# Patient Record
Sex: FEMALE | Race: WHITE | NOT HISPANIC OR LATINO | Employment: FULL TIME | URBAN - METROPOLITAN AREA
[De-identification: names, ages, dates, MRNs, and addresses within clinical notes are randomized per-mention and may not be internally consistent; named-entity substitution may affect disease eponyms.]

---

## 2021-07-20 ENCOUNTER — HOSPITAL ENCOUNTER (EMERGENCY)
Facility: HOSPITAL | Age: 66
Discharge: HOME | End: 2021-07-20
Attending: EMERGENCY MEDICINE
Payer: COMMERCIAL

## 2021-07-20 ENCOUNTER — APPOINTMENT (EMERGENCY)
Dept: RADIOLOGY | Facility: HOSPITAL | Age: 66
End: 2021-07-20
Payer: COMMERCIAL

## 2021-07-20 VITALS
TEMPERATURE: 97.9 F | RESPIRATION RATE: 15 BRPM | SYSTOLIC BLOOD PRESSURE: 169 MMHG | OXYGEN SATURATION: 100 % | WEIGHT: 140 LBS | BODY MASS INDEX: 21.97 KG/M2 | DIASTOLIC BLOOD PRESSURE: 79 MMHG | HEIGHT: 67 IN | HEART RATE: 57 BPM

## 2021-07-20 DIAGNOSIS — S62.652B OPEN NONDISPLACED FRACTURE OF MIDDLE PHALANX OF RIGHT MIDDLE FINGER, INITIAL ENCOUNTER: Primary | ICD-10-CM

## 2021-07-20 DIAGNOSIS — S61.212A LACERATION OF RIGHT MIDDLE FINGER WITHOUT FOREIGN BODY WITHOUT DAMAGE TO NAIL, INITIAL ENCOUNTER: ICD-10-CM

## 2021-07-20 PROCEDURE — 99283 EMERGENCY DEPT VISIT LOW MDM: CPT

## 2021-07-20 PROCEDURE — 12002 RPR S/N/AX/GEN/TRNK2.6-7.5CM: CPT | Performed by: EMERGENCY MEDICINE

## 2021-07-20 PROCEDURE — 73140 X-RAY EXAM OF FINGER(S): CPT

## 2021-07-20 PROCEDURE — 96365 THER/PROPH/DIAG IV INF INIT: CPT

## 2021-07-20 PROCEDURE — 99284 EMERGENCY DEPT VISIT MOD MDM: CPT | Performed by: EMERGENCY MEDICINE

## 2021-07-20 RX ORDER — OXYCODONE HYDROCHLORIDE AND ACETAMINOPHEN 5; 325 MG/1; MG/1
1 TABLET ORAL EVERY 4 HOURS PRN
Qty: 20 TABLET | Refills: 0 | Status: SHIPPED | OUTPATIENT
Start: 2021-07-20

## 2021-07-20 RX ORDER — CEPHALEXIN 500 MG/1
500 CAPSULE ORAL EVERY 6 HOURS SCHEDULED
Qty: 28 CAPSULE | Refills: 0 | Status: SHIPPED | OUTPATIENT
Start: 2021-07-20 | End: 2021-07-27

## 2021-07-20 RX ORDER — GINSENG 100 MG
1 CAPSULE ORAL ONCE
Status: COMPLETED | OUTPATIENT
Start: 2021-07-20 | End: 2021-07-20

## 2021-07-20 RX ORDER — CEFAZOLIN SODIUM 2 G/50ML
2000 SOLUTION INTRAVENOUS ONCE
Status: COMPLETED | OUTPATIENT
Start: 2021-07-20 | End: 2021-07-20

## 2021-07-20 RX ORDER — LIDOCAINE HYDROCHLORIDE 10 MG/ML
10 INJECTION, SOLUTION EPIDURAL; INFILTRATION; INTRACAUDAL; PERINEURAL ONCE
Status: COMPLETED | OUTPATIENT
Start: 2021-07-20 | End: 2021-07-20

## 2021-07-20 RX ADMIN — LIDOCAINE HYDROCHLORIDE 10 ML: 10 INJECTION, SOLUTION EPIDURAL; INFILTRATION; INTRACAUDAL at 13:57

## 2021-07-20 RX ADMIN — BACITRACIN 1 SMALL APPLICATION: 500 OINTMENT TOPICAL at 13:57

## 2021-07-20 RX ADMIN — CEFAZOLIN SODIUM 2000 MG: 2 SOLUTION INTRAVENOUS at 13:56

## 2021-07-20 NOTE — ED PROVIDER NOTES
History  Chief Complaint   Patient presents with    Finger Injury     middle finger on right hand slammed in garage door, sent here by urgent care     Patient is a 59-year-old female  Tetanus vaccination is up-to-date  Around 9:00 a m  She got her right middle finger caught in a manual garage door  She sustained a laceration to the right middle finger on the ventral surface  No associated motor or sensory complaints  No other injuries  She did have a lot of bleeding but that did stop  She is left handed  She presented initially to urgent care but was referred to the emergency room  None       History reviewed  No pertinent past medical history  History reviewed  No pertinent surgical history  History reviewed  No pertinent family history  I have reviewed and agree with the history as documented  E-Cigarette/Vaping     E-Cigarette/Vaping Substances     Social History     Tobacco Use    Smoking status: Never Smoker    Smokeless tobacco: Never Used   Substance Use Topics    Alcohol use: Not Currently    Drug use: Never       Review of Systems   Skin: Positive for wound  Negative for pallor  Neurological: Negative for weakness and numbness  All other systems reviewed and are negative  Physical Exam  Physical Exam  Vitals reviewed  Constitutional:       General: She is not in acute distress  HENT:      Head: Normocephalic and atraumatic  Nose: Nose normal       Mouth/Throat:      Mouth: Mucous membranes are moist    Eyes:      General:         Right eye: No discharge  Left eye: No discharge  Conjunctiva/sclera: Conjunctivae normal    Pulmonary:      Effort: Pulmonary effort is normal  No respiratory distress  Musculoskeletal:         General: Swelling, tenderness and signs of injury present  No deformity  Cervical back: Normal range of motion and neck supple  Comments:  On the right middle finger, ventral surface, there is a 3 cm laceration near the PIP joint oriented axially  Pain there is no active bleeding  Neurovascular exam is normal   There is normal function of the superficial and deep flexors  No foreign body  There is bruising and swelling to the finger distally  Skin:     General: Skin is warm and dry  Findings: No rash  Neurological:      General: No focal deficit present  Mental Status: She is alert and oriented to person, place, and time  Psychiatric:         Mood and Affect: Mood normal          Behavior: Behavior normal          Vital Signs  ED Triage Vitals [07/20/21 1213]   Temperature Pulse Respirations Blood Pressure SpO2   97 9 °F (36 6 °C) 57 15 169/79 100 %      Temp src Heart Rate Source Patient Position - Orthostatic VS BP Location FiO2 (%)   -- -- -- -- --      Pain Score       --           Vitals:    07/20/21 1213   BP: 169/79   Pulse: 57         Visual Acuity      ED Medications  Medications   lidocaine (PF) (XYLOCAINE-MPF) 1 % injection 10 mL (10 mL Infiltration Given 7/20/21 1357)   bacitracin topical ointment 1 small application (1 small application Topical Given 7/20/21 1357)   ceFAZolin (ANCEF) IVPB (premix in dextrose) 2,000 mg 50 mL (2,000 mg Intravenous New Bag 7/20/21 1356)       Diagnostic Studies  Results Reviewed     None                 XR finger third digit - middle RIGHT   ED Interpretation by Octavio Chaney MD (07/20 1357)   There is a comminuted fracture to the distal phalanx of the left 3rd finger                 Procedures  Laceration repair    Date/Time: 7/20/2021 2:51 PM  Performed by: Octavio Chaney MD  Authorized by: Octavio Chaney MD       Procedure Details:  Comments: Finger was anesthetized using 1% plain lidocaine  Laceration was explored  No foreign body  No tendon injury  No open joint  Wound was irrigated using a syringe with 500 mL of normal saline  It was prepped and draped in usual sterile manner  It was closed using 4 simple intermittent 4-0 nylon sutures    It was well approximated  Good hemostasis  Bacitracin and dressing was applied  Static splint was placed with finger in extension  ED Course                             SBIRT 22yo+      Most Recent Value   SBIRT (22 yo +)   In order to provide better care to our patients, we are screening all of our patients for alcohol and drug use  Would it be okay to ask you these screening questions? Yes Filed at: 07/20/2021 1254   Initial Alcohol Screen: US AUDIT-C    1  How often do you have a drink containing alcohol?  0 Filed at: 07/20/2021 1254   2  How many drinks containing alcohol do you have on a typical day you are drinking? 0 Filed at: 07/20/2021 1254   3a  Male UNDER 65: How often do you have five or more drinks on one occasion? 0 Filed at: 07/20/2021 1254   3b  FEMALE Any Age, or MALE 65+: How often do you have 4 or more drinks on one occassion? 0 Filed at: 07/20/2021 1254   Audit-C Score  0 Filed at: 07/20/2021 1254   ARIELLE: How many times in the past year have you    Used an illegal drug or used a prescription medication for non-medical reasons? Never Filed at: 07/20/2021 1254                    MDM  Number of Diagnoses or Management Options  Diagnosis management comments: X-ray shows a tuft fracture  This is an open fracture patient received IV antibiotics  The wound was thoroughly irrigated and closed with 4-0 nylon sutures  She will be discharged on analgesia and antibiotics  Follow-up with hands  She is from out of town    She will call her primary MD for referral        Amount and/or Complexity of Data Reviewed  Tests in the radiology section of CPT®: ordered and reviewed        Disposition  Final diagnoses:   Open nondisplaced fracture of middle phalanx of right middle finger, initial encounter   Laceration of right middle finger without foreign body without damage to nail, initial encounter     Time reflects when diagnosis was documented in both MDM as applicable and the Disposition within this note     Time User Action Codes Description Comment    7/20/2021  2:53 PM Ramonlar Song Add [S04 693S] Finger fracture     7/20/2021  2:53 PM Terir Terry Add [G56 167G] Laceration of left middle finger without foreign body without damage to nail, initial encounter     7/20/2021  2:53 PM [place], [address] Laceration of left middle finger without foreign body without damage to nail, initial encounter     7/20/2021  2:53 PM Terir Terry Remove [R68 [id number]] Finger fracture     7/20/2021  2:53 PM Ramonlar Song Add [S62 652B] Open nondisplaced fracture of middle phalanx of right middle finger, initial encounter     7/20/2021  2:54 PM Terir Song Modify [S62 652B] Open nondisplaced fracture of middle phalanx of right middle finger, initial encounter     7/20/2021  2:54 PM Ramonlar Terry Remove [Y14 [id number]] Laceration of left middle finger without foreign body without damage to nail, initial encounter     7/20/2021  2:54 PM Ramonlar Song Add [N85 [id number]] Laceration of right middle finger without foreign body without damage to nail, initial encounter       ED Disposition     ED Disposition Condition Date/Time Comment    Discharge Stable Tue Jul 20, 2021  2:53 PM Vee Brito discharge to home/self care              Follow-up Information     Follow up With Specialties Details Why Contact Info    Follow-up with hand surgeon within 1 week for re-evaluation, call your family doctor for referral              Patient's Medications   Discharge Prescriptions    CEPHALEXIN (KEFLEX) 500 MG CAPSULE    Take 1 capsule (500 mg total) by mouth every 6 (six) hours for 7 days       Start Date: 7/20/2021 End Date: 7/27/2021       Order Dose: 500 mg       Quantity: 28 capsule    Refills: 0    OXYCODONE-ACETAMINOPHEN (PERCOCET) 5-325 MG PER TABLET    Take 1 tablet by mouth every 4 (four) hours as needed for severe painMax Daily Amount: 6 tablets       Start Date: 7/20/2021 End Date: --       Order Dose: 1 tablet       Quantity: 20 tablet    Refills: 0     No discharge procedures on file      PDMP Review     None          ED Provider  Electronically Signed by           Bereket Ferrer MD  07/20/21 6288